# Patient Record
(demographics unavailable — no encounter records)

---

## 2024-12-19 NOTE — HISTORY OF PRESENT ILLNESS
[FreeTextEntry1] : Patient with positive cardiac risk factors for CAD presents for initial evaluation. pt denies recent sscp or sob, here for htn eval, prior smoker. she takes bp at home with better results

## 2024-12-19 NOTE — DISCUSSION/SUMMARY
[Hypertension] : hypertension [Responding to Treatment] : responding to treatment [Outpatient Evaluation] : outpatient evaluation [Ambulatory BP Monitoring] : ambulatory blood pressure monitoring [None] : There are no changes in medication management [Exercise Regimen] : an exercise regimen [Low Sodium Diet] : low sodium diet [de-identified] : bp readings at home better, machine matches on visit 6/20/2024

## 2025-07-07 NOTE — DISCUSSION/SUMMARY
[Hypertension] : hypertension [Responding to Treatment] : responding to treatment [Outpatient Evaluation] : outpatient evaluation [Ambulatory BP Monitoring] : ambulatory blood pressure monitoring [Echocardiogram] : echocardiogram [None] : There are no changes in medication management [Exercise Regimen] : an exercise regimen [Low Sodium Diet] : low sodium diet [de-identified] : bp readings at home better, machine matches on visit 6/20/2024

## 2025-07-07 NOTE — HISTORY OF PRESENT ILLNESS
[FreeTextEntry1] : Patient with positive cardiac risk factors for CAD presents for f/u evaluation. pt denies recent sscp or sob, here for htn eval, prior smoker. she takes bp at home with better results